# Patient Record
Sex: FEMALE | Race: WHITE | NOT HISPANIC OR LATINO | Employment: FULL TIME | URBAN - METROPOLITAN AREA
[De-identification: names, ages, dates, MRNs, and addresses within clinical notes are randomized per-mention and may not be internally consistent; named-entity substitution may affect disease eponyms.]

---
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
## 2019-02-21 ENCOUNTER — OFFICE VISIT - RIVER FALLS (OUTPATIENT)
Dept: FAMILY MEDICINE | Facility: CLINIC | Age: 19
End: 2019-02-21

## 2019-07-31 ENCOUNTER — OFFICE VISIT - RIVER FALLS (OUTPATIENT)
Dept: FAMILY MEDICINE | Facility: CLINIC | Age: 19
End: 2019-07-31

## 2019-07-31 LAB
ALBUMIN UR-MCNC: NEGATIVE G/DL
BACTERIA #/AREA URNS HPF: NORMAL /[HPF]
BILIRUB UR QL STRIP: NEGATIVE
EPITHELIAL CELLS UR: NORMAL
GLUCOSE UR STRIP-MCNC: NEGATIVE MG/DL
HCG UR QL: NEGATIVE
HGB UR QL STRIP: NEGATIVE
KETONES UR STRIP-MCNC: NEGATIVE MG/DL
LEUKOCYTE ESTERASE UR QL STRIP: ABNORMAL
NITRATE UR QL: NEGATIVE
PH UR STRIP: 8 [PH] (ref 5–8)
RBC #/AREA URNS AUTO: NORMAL /[HPF]
SP GR UR STRIP: 1.01 (ref 1–1.03)
WBC #/AREA URNS AUTO: NORMAL /[HPF]

## 2019-07-31 ASSESSMENT — MIFFLIN-ST. JEOR: SCORE: 1614.3

## 2019-08-01 ENCOUNTER — COMMUNICATION - RIVER FALLS (OUTPATIENT)
Dept: FAMILY MEDICINE | Facility: CLINIC | Age: 19
End: 2019-08-01

## 2019-08-01 LAB
25(OH)D3 SERPL-MCNC: 25 NG/ML (ref 30–100)
A/G RATIO - HISTORICAL: 1.3 (ref 1–2.5)
ALBUMIN SERPL-MCNC: 4.3 GM/DL (ref 3.6–5.1)
ALP SERPL-CCNC: 63 UNIT/L (ref 33–115)
ALT SERPL W P-5'-P-CCNC: 14 UNIT/L (ref 6–29)
AST SERPL W P-5'-P-CCNC: 11 UNIT/L (ref 10–30)
B BURGDOR IGG+IGM SER QL: <0.9
BILIRUB DIRECT SERPL-MCNC: 0.1 MG/DL
BILIRUB INDIRECT SERPL-MCNC: 0.3 MG/DL (ref 0.2–1.2)
BILIRUB SERPL-MCNC: 0.4 MG/DL (ref 0.2–1.2)
BUN SERPL-MCNC: 11 MG/DL (ref 7–25)
BUN/CREAT RATIO - HISTORICAL: NORMAL (ref 6–22)
C REACTIVE PROTEIN LHE: 6.8 MG/L
CALCIUM SERPL-MCNC: 9.9 MG/DL (ref 8.6–10.2)
CHLORIDE BLD-SCNC: 106 MMOL/L (ref 98–110)
CO2 SERPL-SCNC: 25 MMOL/L (ref 20–32)
CREAT SERPL-MCNC: 0.77 MG/DL (ref 0.5–1.1)
EGFRCR SERPLBLD CKD-EPI 2021: 111 ML/MIN/1.73M2
ERYTHROCYTE [DISTWIDTH] IN BLOOD BY AUTOMATED COUNT: 12 % (ref 11–15)
GLOBULIN: 3.2 (ref 1.9–3.7)
GLUCOSE BLD-MCNC: 90 MG/DL (ref 65–99)
HCT VFR BLD AUTO: 38.9 % (ref 35–45)
HGB BLD-MCNC: 13.4 GM/DL (ref 11.7–15.5)
MCH RBC QN AUTO: 29.5 PG (ref 27–33)
MCHC RBC AUTO-ENTMCNC: 34.4 GM/DL (ref 32–36)
MCV RBC AUTO: 85.7 FL (ref 80–100)
PLATELET # BLD AUTO: 313 10*3/UL (ref 140–400)
PMV BLD: 10.7 FL (ref 7.5–12.5)
POTASSIUM BLD-SCNC: 4.2 MMOL/L (ref 3.5–5.3)
PROT SERPL-MCNC: 7.5 GM/DL (ref 6.1–8.1)
RBC # BLD AUTO: 4.54 10*6/UL (ref 3.8–5.1)
SODIUM SERPL-SCNC: 138 MMOL/L (ref 135–146)
TSH SERPL DL<=0.005 MIU/L-ACNC: 1.04 MIU/L
VIT B12 SERPL-MCNC: 331 PG/ML (ref 200–1100)
WBC # BLD AUTO: 8.9 10*3/UL (ref 3.8–10.8)

## 2019-08-03 ENCOUNTER — COMMUNICATION - RIVER FALLS (OUTPATIENT)
Dept: FAMILY MEDICINE | Facility: CLINIC | Age: 19
End: 2019-08-03

## 2019-08-03 LAB — BACTERIA SPEC CULT: ABNORMAL

## 2019-08-13 ENCOUNTER — OFFICE VISIT - RIVER FALLS (OUTPATIENT)
Dept: FAMILY MEDICINE | Facility: CLINIC | Age: 19
End: 2019-08-13

## 2019-08-13 ASSESSMENT — MIFFLIN-ST. JEOR: SCORE: 1550.35

## 2022-02-11 VITALS
SYSTOLIC BLOOD PRESSURE: 107 MMHG | DIASTOLIC BLOOD PRESSURE: 70 MMHG | HEART RATE: 78 BPM | BODY MASS INDEX: 30.74 KG/M2 | TEMPERATURE: 97.8 F | WEIGHT: 187.6 LBS

## 2022-02-11 VITALS
HEART RATE: 72 BPM | BODY MASS INDEX: 30.13 KG/M2 | HEIGHT: 66 IN | BODY MASS INDEX: 27.87 KG/M2 | SYSTOLIC BLOOD PRESSURE: 112 MMHG | SYSTOLIC BLOOD PRESSURE: 114 MMHG | HEART RATE: 64 BPM | DIASTOLIC BLOOD PRESSURE: 70 MMHG | TEMPERATURE: 98.9 F | WEIGHT: 187.5 LBS | WEIGHT: 173.4 LBS | HEIGHT: 66 IN | DIASTOLIC BLOOD PRESSURE: 68 MMHG

## 2022-02-11 VITALS
BODY MASS INDEX: 26.29 KG/M2 | TEMPERATURE: 96.8 F | SYSTOLIC BLOOD PRESSURE: 123 MMHG | HEIGHT: 66 IN | DIASTOLIC BLOOD PRESSURE: 81 MMHG | HEART RATE: 96 BPM | WEIGHT: 163.6 LBS

## 2022-02-11 VITALS
SYSTOLIC BLOOD PRESSURE: 108 MMHG | TEMPERATURE: 97.4 F | WEIGHT: 170 LBS | BODY MASS INDEX: 27.86 KG/M2 | HEART RATE: 88 BPM | DIASTOLIC BLOOD PRESSURE: 64 MMHG

## 2022-02-11 VITALS
BODY MASS INDEX: 29.09 KG/M2 | SYSTOLIC BLOOD PRESSURE: 118 MMHG | DIASTOLIC BLOOD PRESSURE: 60 MMHG | TEMPERATURE: 99.1 F | WEIGHT: 181 LBS | HEIGHT: 66 IN | HEART RATE: 80 BPM
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
## 2022-02-16 NOTE — TELEPHONE ENCOUNTER
---------------------  From: Lita Duenas RN   To: Fernando Horton MD; Phone Messages Pool ([id number]_[place] [facility] [place]);     Sent: 8/3/2019 1:07:09 PM CDT  Subject: Urine Culture Results     Patient [facility] on 7-31-19 c/o upper abdominal pain.  No antibiotics prescribed.   Urine Culture results below - please advise.         CULTURE, URINE, ROUTINE         MICRO NUMBER:      [id number]    TEST STATUS:       FINAL    SPECIMEN SOURCE:   URINE, CLEAN CATCH    SPECIMEN QUALITY:  ADEQUATE    RESULT:            Greater than 100,000 CFU/mL of Escherichia coli                              E.coli                            ----------------                            INT   BETINA     AMOX/CLAVULANATE       S     4     AMPICILLIN             R     >=32     AMP/SULBACTAM          I     16     CEFAZOLIN              NR    <=4 **2     CEFEPIME               S     <=1     CEFTRIAXONE            S     <=1     CIPROFLOXACIN          S     <=0.25     ERTAPENEM              S     <=0.5     GENTAMICIN             S     <=1     IMIPENEM               S     <=0.25     LEVOFLOXACIN           S     <=0.12     NITROFURANTOIN         S     <=16     PIP/TAZOBACTAM         S     <=4     TOBRAMYCIN             S     <=1     TRIMETHOPRIM/SULFA     S     <=20    S=Susceptible  I=Intermediate  R=Resistant  * = Not Tested  NR = Not Reported  **NN = See Therapy Comments        Results:  Date Result Name Ind Value   7/31/2019 4:40 PM Culture Urine ((A)) See commentRecommend cipro 500mg bid x 7 days to pharmacy of her choice.  Thanks---------------------  From: Fernando Horton MD   To: Lita Duenas RN;     Sent: 8/3/2019 1:13:12 PM CDT  Subject: RE: Urine Culture ResultsInformed patient of results and antibiotic prescription. She expressed understanding and requested prescription be sent to [facility]

## 2022-02-16 NOTE — NURSING NOTE
Comprehensive Intake Entered On:  2/21/2019 11:06 AM CST    Performed On:  2/21/2019 11:04 AM CST by Meghana Warren               Summary   Chief Complaint :   Cramping, vomiting, upset stomach.    Menstrual Status :   Menarcheal   Weight Measured :   187.6 lb(Converted to: 187 lb 10 oz, 85.09 kg)    Systolic Blood Pressure :   107 mmHg   Diastolic Blood Pressure :   70 mmHg   Mean Arterial Pressure :   82 mmHg   Peripheral Pulse Rate :   78 bpm   BP Method :   Electronic   HR Method :   Electronic   Temperature Tympanic :   97.8 DegF(Converted to: 36.6 DegC)  (LOW)    Meghana Warren - 2/21/2019 11:04 AM CST   Health Status   Allergies Verified? :   Yes   Medication History Verified? :   Yes   Meghana Warren - 2/21/2019 11:04 AM CST   Meds / Allergies   (As Of: 2/21/2019 11:06:58 AM CST)   Allergies (Active)   amoxicillin  Estimated Onset Date:   Unspecified ; Reactions:   rash ; Created By:   Manda Levin; Reaction Status:   Active ; Category:   Drug ; Substance:   amoxicillin ; Type:   Allergy ; Updated By:   Manda Levin; Reviewed Date:   10/7/2017 8:44 AM CDT        Medication List   (As Of: 2/21/2019 11:06:58 AM CST)   Prescription/Discharge Order    sertraline  :   sertraline ; Status:   Completed ; Ordered As Mnemonic:   sertraline 100 mg oral tablet ; Simple Display Line:   100 mg, 1 tab(s), PO, Daily, 30 tab(s), 2 Refill(s) ; Ordering Provider:   Elizabeth Sim MD; Catalog Code:   sertraline ; Order Dt/Tm:   10/4/2017 2:20:25 PM          ethinyl estradiol-levonorgestrel  :   ethinyl estradiol-levonorgestrel ; Status:   Prescribed ; Ordered As Mnemonic:   Quasense 0.15 mg-30 mcg oral tablet ; Simple Display Line:   1 tab(s), po, daily, 91 tab(s), 3 Refill(s) ; Ordering Provider:   Elizabeth Sim MD; Catalog Code:   ethinyl estradiol-levonorgestrel ; Order Dt/Tm:   10/4/2017 2:18:39 PM          busPIRone  :   busPIRone ; Status:   Completed ; Ordered As Mnemonic:   BuSpar 10 mg oral tablet  ; Simple Display Line:   10 mg, 1 tab(s), PO, TID, ok to start 1 at hs for a week, then 1 bid for a week then 1 tid for anxiety, 90 tab(s), 0 Refill(s) ; Ordering Provider:   Lexi Valle; Catalog Code:   busPIRone ; Order Dt/Tm:   8/14/2017 7:58:00 PM            Home Meds    traZODone  :   traZODone ; Status:   Documented ; Ordered As Mnemonic:   traZODone 50 mg oral tablet ; Simple Display Line:   1-3 tab(s), Oral, hs, PRN: for sleep, 0 Refill(s) ; Catalog Code:   traZODone ; Order Dt/Tm:   2/21/2019 11:06:28 AM

## 2022-02-16 NOTE — TELEPHONE ENCOUNTER
---------------------  From: Haylie Cruz (eRx Pool ([id number]_[facility]))   To: [facility] Message Pool ([id number]);     Sent: 9/20/2019 3:31:08 PM CDT  Subject: FW: Medication Management   Due Date/Time: 9/21/2019 2:25:00 PM CDT           Entered by Haylie Cruz on September 20, 2019 3:30:58 PM CDT  Please advise on refill- needs appt?      ** Patient matched by Haylie Cruz on 9/20/2019 3:29:17 PM CDT **      ------------------------------------------  From: [facility]  To: Lexi Valle  Sent: September 20, 2019 2:25:41 PM CDT  Subject: Medication Management  Due: September 21, 2019 2:25:41 PM CDT    ** On Hold Pending Signature **  Drug: drospirenone-ethinyl estradiol (drospirenone-ethinyl estradiol 3 mg-0.02 mg oral tablet)  TAKE 1 TABLET BY MOUTH DAILY. START WHEN BABY IS 4 WEEKS OLD  Quantity: 84 tab(s)     Days Supply: 84        Refills: 3  Substitutions Allowed  Notes from Pharmacy: Generic For:DANISHA 3-0.02MG   N O T I C E    PRESCRIPTION PREVIOUSLY AUTHORIZED BY DOCTOR:JARON KAUR [phone]    Dispensed Drug: drospirenone-ethinyl estradiol (drospirenone-ethinyl estradiol 3 mg-0.02 mg oral tablet)  TAKE 1 TABLET BY MOUTH DAILY. START WHEN BABY IS 4 WEEKS OLD  Quantity: 84 tab(s)     Days Supply: 84        Refills: 3  Substitutions Allowed  Notes from Pharmacy: Generic For:DANISHA 3-0.02MG   N O T I C E    PRESCRIPTION PREVIOUSLY AUTHORIZED BY DOCTOR:JARON KAUR [phone]  ---------------------------------------------------------------  From: Haylie rCuz ([email or web address] Pool ([id number]_[facility]))   To: Lexi Valle;     Sent: 9/20/2019 3:31:28 PM CDT  Subject: FW: Medication Management   Due Date/Time: 9/21/2019 2:25:00 PM CDT---------------------  From: Lexi Valle   To: [email or web address] Pool ([id number]_[facility]);     Sent: 9/20/2019 3:41:22 PM CDT  Subject: RE: Medication Management     she should call her OB provider for an extension (rx  states to start when baby 4 weeks old) or schedule with me/[employer]---------------------  From: Haylie Cruz   To: [facility]    Sent: 9/20/2019 4:09:01 PM CDT  Subject: RE: Medication Management     ** Not Approved: Patient needs appointment **  drospirenone-ethinyl estradiol (DROSPIRENONE-ETHINYL ESTRADIOL TABLET 3-0.02 MG)  TAKE 1 TABLET BY MOUTH DAILY. START WHEN BABY IS 4 WEEKS OLD  Qty:  84 tab(s)        Days Supply:  84        Refills:  3          Substitutions Allowed     Route To Pharmacy - [facility]   Note from Pharmacy:  Generic For:DANISHA 3-0.02MG   N O T I C E    PRESCRIPTION PREVIOUSLY AUTHORIZED BY DOCTOR:JARON KAUR [phone]  Signed by Haylie Cruz

## 2022-02-16 NOTE — NURSING NOTE
Comprehensive Intake Entered On:  7/31/2019 3:03 PM CDT    Performed On:  7/31/2019 2:57 PM CDT by Ayanna Moreno CMA               Summary   Chief Complaint :   c/o upper abdominal pain, headaches, not sleeping well at night.   Menstrual Status :   Menarcheal   Weight Measured :   187.5 lb(Converted to: 187 lb 8 oz, 85.05 kg)    Height Measured :   65.5 in(Converted to: 5 ft 5 in, 166.37 cm)    Body Mass Index :   30.72 kg/m2 (HI)    Body Surface Area :   1.98 m2   Systolic Blood Pressure :   114 mmHg   Diastolic Blood Pressure :   68 mmHg   Mean Arterial Pressure :   83 mmHg   Peripheral Pulse Rate :   72 bpm   Temperature Tympanic :   98.9 DegF(Converted to: 37.2 DegC)    Ayanna Moreno CMA - 7/31/2019 2:57 PM CDT   Health Status   Allergies Verified? :   Yes   Medication History Verified? :   Yes   Pre-Visit Planning Status :   Completed   Tobacco Use? :   Current every day smoker   Ayanna Moreno CMA - 7/31/2019 2:57 PM CDT   Meds / Allergies   (As Of: 7/31/2019 3:03:40 PM CDT)   Allergies (Active)   amoxicillin  Estimated Onset Date:   Unspecified ; Reactions:   rash ; Created By:   Manda Levin; Reaction Status:   Active ; Category:   Drug ; Substance:   amoxicillin ; Type:   Allergy ; Updated By:   Manda Levin; Reviewed Date:   2/21/2019 11:18 AM CST        Medication List   (As Of: 7/31/2019 3:03:40 PM CDT)   Prescription/Discharge Order    ethinyl estradiol-levonorgestrel  :   ethinyl estradiol-levonorgestrel ; Status:   Prescribed ; Ordered As Mnemonic:   Quasense 0.15 mg-30 mcg oral tablet ; Simple Display Line:   1 tab(s), po, daily, 91 tab(s), 3 Refill(s) ; Ordering Provider:   Elizabeth Sim MD; Catalog Code:   ethinyl estradiol-levonorgestrel ; Order Dt/Tm:   10/4/2017 2:18:39 PM          ondansetron  :   ondansetron ; Status:   Processing ; Ordered As Mnemonic:   Zofran ODT 4 mg oral tablet, disintegrating ; Simple Display Line:   4 mg, 1 tab(s), Oral, tid, PRN: for  nausea/vomiting, 12 tab(s), 0 Refill(s) ; Ordering Provider:   Aaron Saxena MD; Action Display:   Discontinue ; Catalog Code:   ondansetron ; Order Dt/Tm:   7/31/2019 3:01:34 PM          pantoprazole  :   pantoprazole ; Status:   Processing ; Ordered As Mnemonic:   Protonix 40 mg oral delayed release tablet ; Simple Display Line:   40 mg, 1 tab(s), PO, Daily, 30 tab(s), 0 Refill(s) ; Ordering Provider:   Aaron Saxena MD; Action Display:   Discontinue ; Catalog Code:   pantoprazole ; Order Dt/Tm:   7/31/2019 3:01:56 PM            Home Meds    traZODone  :   traZODone ; Status:   Processing ; Ordered As Mnemonic:   traZODone 50 mg oral tablet ; Simple Display Line:   1-3 tab(s), Oral, hs, PRN: for sleep, 0 Refill(s) ; Action Display:   Discontinue ; Catalog Code:   traZODone ; Order Dt/Tm:   7/31/2019 3:01:56 PM

## 2022-02-16 NOTE — TELEPHONE ENCOUNTER
---------------------  From: Ayanna Moreno CMA   To: Aaron Saxena MD;     Sent: 8/6/2019 1:10:51 PM CDT  Subject: General Message     Pt stops in today stating that she started having a allergic reaction to the antibiotic she was started on is wondering this could be changed. She is planning to follow up with us next week to go over her CT results. The CT was done this morning. It looks like pt was started on Cipro on 8/3 by ADONAY due to her urine culture results.  Please advise---------------------  From: Aaron Saxena MD   To: Ayanna Moreno CMA;     Sent: 8/6/2019 1:37:05 PM CDT  Subject: RE: General Message     nitrofurantoin 50mg po qid for 5 daysPt called and informed new [facility] was [facility]

## 2022-02-16 NOTE — NURSING NOTE
Comprehensive Intake Entered On:  8/13/2019 11:52 AM CDT    Performed On:  8/13/2019 11:50 AM CDT by Ayanna Moreno CMA               Summary   Chief Complaint :   f/u upper abdominal pain. Feeling better   Menstrual Status :   Menarcheal   Weight Measured :   173.4 lb(Converted to: 173 lb 6 oz, 78.65 kg)    Height Measured :   65.5 in(Converted to: 5 ft 5 in, 166.37 cm)    Body Mass Index :   28.41 kg/m2 (HI)    Body Surface Area :   1.9 m2   Systolic Blood Pressure :   112 mmHg   Diastolic Blood Pressure :   70 mmHg   Mean Arterial Pressure :   84 mmHg   Peripheral Pulse Rate :   64 bpm   Ayanna Moreno CMA - 8/13/2019 11:50 AM CDT   Health Status   Allergies Verified? :   Yes   Medication History Verified? :   Yes   Pre-Visit Planning Status :   Completed   Tobacco Use? :   Current every day smoker   Ayanna Moreno CMA - 8/13/2019 11:50 AM CDT   Consents   Consent for Immunization Exchange :   Consent Granted   Consent for Immunizations to Providers :   Consent Granted   Ayanna Moreno CMA - 8/13/2019 11:50 AM CDT   Meds / Allergies   (As Of: 8/13/2019 11:52:47 AM CDT)   Allergies (Active)   amoxicillin  Estimated Onset Date:   Unspecified ; Reactions:   rash ; Created By:   Manda Levin; Reaction Status:   Active ; Category:   Drug ; Substance:   amoxicillin ; Type:   Allergy ; Updated By:   Manda Levin; Reviewed Date:   2/21/2019 11:18 AM CST      Cipro  Estimated Onset Date:   Unspecified ; Created By:   Ayanna Moreno CMA; Reaction Status:   Active ; Category:   Drug ; Substance:   Cipro ; Type:   Allergy ; Updated By:   Ayanna Moreno CMA; Reviewed Date:   8/6/2019 1:52 PM CDT        Medication List   (As Of: 8/13/2019 11:52:47 AM CDT)   Prescription/Discharge Order    ethinyl estradiol-levonorgestrel  :   ethinyl estradiol-levonorgestrel ; Status:   Prescribed ; Ordered As Mnemonic:   Quasense 0.15 mg-30 mcg oral tablet ; Simple Display Line:   1 tab(s), po, daily,  91 tab(s), 3 Refill(s) ; Ordering Provider:   Elizabeth Sim MD; Catalog Code:   ethinyl estradiol-levonorgestrel ; Order Dt/Tm:   10/4/2017 2:18:39 PM
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
## 2022-02-16 NOTE — LETTER
(Inserted Image. Unable to display)   August 01, 2019        MABEL GUEVARA       [address] [place] [phone]        Dear MABEL,    Thank you for choosing [facility] for your healthcare needs. Below you will find the results of your recent test(s) done at our clinic.      Your labs are OK except for vitamin D level.  Taking vitamin 5000 units per day for 2 months would correct this.  I do not think this is causing your abdominal pain but it can cause some malaise.      Result Name Current Result Reference Range   Sodium Level (mmol/L)  138 7/31/2019 135 - 146   Potassium Level (mmol/L)  4.2 7/31/2019 3.5 - 5.3   Chloride Level (mmol/L)  106 7/31/2019 98 - 110   CO2 Level (mmol/L)  25 7/31/2019 20 - 32   Glucose Level (mg/dL)  90 7/31/2019 65 - 99   BUN (mg/dL)  11 7/31/2019 7 - 25   Creatinine Level (mg/dL)  0.77 7/31/2019 0.50 - 1.10   eGFR (mL/min/1.73m2)  111 7/31/2019 > OR = 60 -    eGFR  (mL/min/1.73m2)  129 7/31/2019 > OR = 60 -    Calcium Level (mg/dL)  9.9 7/31/2019 8.6 - 10.2   Bilirubin Total (mg/dL)  0.4 7/31/2019 0.2 - 1.2   Bilirubin Direct (mg/dL)  0.1 7/31/2019  - < OR = 0.2   Bilirubin Indirect  0.3 7/31/2019 0.2 - 1.2   Alkaline Phosphatase (unit/L)  63 7/31/2019 33 - 115   AST/SGOT (unit/L)  11 7/31/2019 10 - 30   ALT/SGPT (unit/L)  14 7/31/2019 6 - 29   Protein Total (gm/dL)  7.5 7/31/2019 6.1 - 8.1   Albumin Level (gm/dL)  4.3 7/31/2019 3.6 - 5.1   Globulin  3.2 7/31/2019 1.9 - 3.7   A/G Ratio  1.3 7/31/2019 1.0 - 2.5   C-Reactive Protein (CRP) (mg/L)  6.8 7/31/2019  - <8.0   Vitamin B12 Level (pg/mL)  331 7/31/2019 200 - 1,100   Vitamin D 25 OH (ng/mL) ((L)) 25 7/31/2019 30 - 100   TSH (mIU/L)  1.04 7/31/2019    WBC  8.9 7/31/2019 3.8 - 10.8   RBC  4.54 7/31/2019 3.80 - 5.10   Hgb (gm/dL)  13.4 7/31/2019 11.7 - 15.5   Hct (%)  38.9 7/31/2019 35.0 - 45.0   MCV (fL)  85.7 7/31/2019 80.0 - 100.0   MCH (pg)  29.5 7/31/2019 27.0 - 33.0   MCHC (gm/dL)   34.4 7/31/2019 32.0 - 36.0   RDW (%)  12.0 7/31/2019 11.0 - 15.0   Platelet  313 7/31/2019 140 - 400   MPV (fL)  10.7 7/31/2019 7.5 - 12.5   Lyme Ab IgG/IgM WB  <0.90 7/31/2019        Please contact me or my assistant at [phone] if you have any questions or concerns.     Sincerely,        Aaron Saxena MD        What do your labs mean?  Below is a glossary of commonly ordered labs:  LDL   Bad Cholesterol   HDL   Good Cholesterol  AST/ALT   Liver Function   Cr/Creatinine   Kidney Function  Microalbumin   Kidney Function  BUN   Kidney Function  PSA   Prostate    TSH   Thyroid Hormone  HgbA1c   Diabetes Test   Hgb (Hemoglobin)   Red Blood Cells

## 2022-02-16 NOTE — LETTER
(Inserted Image. Unable to display)   August 07, 2019      MABEL GUEVARA   [address] [place] [phone]        Dear MABEL,      Thank you for selecting [facility] for your healthcare needs.       the CT scan is normal and shows no findings that would cause abdominal pain          Please contact me or my assistant at [phone] you have any questions or concerns.     Sincerely,        Aaron Saxena MD

## 2022-02-16 NOTE — TELEPHONE ENCOUNTER
Patient informed of appt scheduled at [facility] on 2-25-19 at 9:30.  NPO 6-8 hrs prior.  Order sent to [facility].

## 2024-05-04 ENCOUNTER — OFFICE VISIT (OUTPATIENT)
Dept: URGENT CARE | Facility: URGENT CARE | Age: 24
End: 2024-05-04
Payer: COMMERCIAL

## 2024-05-04 VITALS
SYSTOLIC BLOOD PRESSURE: 111 MMHG | HEART RATE: 99 BPM | RESPIRATION RATE: 18 BRPM | BODY MASS INDEX: 32.61 KG/M2 | WEIGHT: 199 LBS | TEMPERATURE: 97.9 F | OXYGEN SATURATION: 99 % | DIASTOLIC BLOOD PRESSURE: 76 MMHG

## 2024-05-04 DIAGNOSIS — J06.9 VIRAL URI: Primary | ICD-10-CM

## 2024-05-04 DIAGNOSIS — G44.209 TENSION HEADACHE: ICD-10-CM

## 2024-05-04 PROCEDURE — 99202 OFFICE O/P NEW SF 15 MIN: CPT | Performed by: FAMILY MEDICINE
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
## 2024-05-04 NOTE — LETTER
May 4, 2024      Primo Sauer   [address] [place] [phone]        To Whom It May Concern:    Primo Sauer  was seen on 4/4/2024.  Please excuse her  until 4/5/2024 due to illness.        Sincerely,        Angus Watson MD